# Patient Record
Sex: FEMALE | Race: WHITE | NOT HISPANIC OR LATINO | Employment: OTHER | URBAN - METROPOLITAN AREA
[De-identification: names, ages, dates, MRNs, and addresses within clinical notes are randomized per-mention and may not be internally consistent; named-entity substitution may affect disease eponyms.]

---

## 2018-01-01 ENCOUNTER — HOSPITAL ENCOUNTER (INPATIENT)
Facility: MEDICAL CENTER | Age: 83
LOS: 1 days | DRG: 871 | End: 2018-09-12
Attending: EMERGENCY MEDICINE | Admitting: HOSPITALIST

## 2018-01-01 ENCOUNTER — APPOINTMENT (OUTPATIENT)
Dept: RADIOLOGY | Facility: MEDICAL CENTER | Age: 83
DRG: 871 | End: 2018-01-01

## 2018-01-01 ENCOUNTER — HOSPICE ADMISSION (OUTPATIENT)
Dept: HOSPICE | Facility: HOSPICE | Age: 83
End: 2018-01-01

## 2018-01-01 ENCOUNTER — APPOINTMENT (OUTPATIENT)
Dept: RADIOLOGY | Facility: MEDICAL CENTER | Age: 83
DRG: 871 | End: 2018-01-01
Attending: HOSPITALIST

## 2018-01-01 VITALS
SYSTOLIC BLOOD PRESSURE: 84 MMHG | DIASTOLIC BLOOD PRESSURE: 40 MMHG | WEIGHT: 170.19 LBS | BODY MASS INDEX: 29.06 KG/M2 | TEMPERATURE: 98.2 F | HEART RATE: 97 BPM | OXYGEN SATURATION: 96 % | HEIGHT: 64 IN

## 2018-01-01 DIAGNOSIS — R65.21 SEPTIC SHOCK (HCC): ICD-10-CM

## 2018-01-01 DIAGNOSIS — A41.9 SEPTIC SHOCK (HCC): ICD-10-CM

## 2018-01-01 LAB
ALBUMIN SERPL BCP-MCNC: 3.1 G/DL (ref 3.2–4.9)
ALBUMIN/GLOB SERPL: 0.7 G/DL
ALP SERPL-CCNC: 89 U/L (ref 30–99)
ALT SERPL-CCNC: 12 U/L (ref 2–50)
AMMONIA PLAS-SCNC: 36 UMOL/L (ref 11–45)
AMORPH CRY #/AREA URNS HPF: PRESENT /HPF
AMPHET UR QL SCN: NEGATIVE
ANION GAP SERPL CALC-SCNC: 10 MMOL/L (ref 0–11.9)
ANION GAP SERPL CALC-SCNC: 15 MMOL/L (ref 0–11.9)
ANISOCYTOSIS BLD QL SMEAR: ABNORMAL
APPEARANCE UR: ABNORMAL
AST SERPL-CCNC: 36 U/L (ref 12–45)
BACTERIA #/AREA URNS HPF: ABNORMAL /HPF
BARBITURATES UR QL SCN: NEGATIVE
BASOPHILS # BLD AUTO: 0 % (ref 0–1.8)
BASOPHILS # BLD: 0 K/UL (ref 0–0.12)
BENZODIAZ UR QL SCN: NEGATIVE
BILIRUB SERPL-MCNC: 0.6 MG/DL (ref 0.1–1.5)
BILIRUB UR QL STRIP.AUTO: NEGATIVE
BUN SERPL-MCNC: 47 MG/DL (ref 8–22)
BUN SERPL-MCNC: 55 MG/DL (ref 8–22)
BZE UR QL SCN: NEGATIVE
CALCIUM SERPL-MCNC: 5.9 MG/DL (ref 8.5–10.5)
CALCIUM SERPL-MCNC: 9 MG/DL (ref 8.5–10.5)
CANNABINOIDS UR QL SCN: NEGATIVE
CHLORIDE SERPL-SCNC: 109 MMOL/L (ref 96–112)
CHLORIDE SERPL-SCNC: 119 MMOL/L (ref 96–112)
CO2 SERPL-SCNC: 12 MMOL/L (ref 20–33)
CO2 SERPL-SCNC: 16 MMOL/L (ref 20–33)
COLOR UR: YELLOW
CREAT SERPL-MCNC: 3.24 MG/DL (ref 0.5–1.4)
CREAT SERPL-MCNC: 4.28 MG/DL (ref 0.5–1.4)
EKG IMPRESSION: NORMAL
EKG IMPRESSION: NORMAL
EOSINOPHIL # BLD AUTO: 0 K/UL (ref 0–0.51)
EOSINOPHIL NFR BLD: 0 % (ref 0–6.9)
EPI CELLS #/AREA URNS HPF: ABNORMAL /HPF
ERYTHROCYTE [DISTWIDTH] IN BLOOD BY AUTOMATED COUNT: 55.3 FL (ref 35.9–50)
ERYTHROCYTE [DISTWIDTH] IN BLOOD BY AUTOMATED COUNT: 59.8 FL (ref 35.9–50)
ETHANOL BLD-MCNC: 0 G/DL
GLOBULIN SER CALC-MCNC: 4.3 G/DL (ref 1.9–3.5)
GLUCOSE BLD-MCNC: 116 MG/DL (ref 65–99)
GLUCOSE SERPL-MCNC: 123 MG/DL (ref 65–99)
GLUCOSE SERPL-MCNC: 132 MG/DL (ref 65–99)
GLUCOSE UR STRIP.AUTO-MCNC: NEGATIVE MG/DL
HCG SERPL QL: NEGATIVE
HCT VFR BLD AUTO: 35.2 % (ref 37–47)
HCT VFR BLD AUTO: 44 % (ref 37–47)
HGB BLD-MCNC: 11.3 G/DL (ref 12–16)
HGB BLD-MCNC: 14.9 G/DL (ref 12–16)
KETONES UR STRIP.AUTO-MCNC: ABNORMAL MG/DL
LACTATE BLD-SCNC: 2.2 MMOL/L (ref 0.5–2)
LACTATE BLD-SCNC: 2.8 MMOL/L (ref 0.5–2)
LACTATE BLD-SCNC: 3.7 MMOL/L (ref 0.5–2)
LACTATE BLD-SCNC: 4.2 MMOL/L (ref 0.5–2)
LEUKOCYTE ESTERASE UR QL STRIP.AUTO: ABNORMAL
LV EJECT FRACT  99904: 55
LV EJECT FRACT MOD 2C 99903: 52.84
LV EJECT FRACT MOD 4C 99902: 65.21
LV EJECT FRACT MOD BP 99901: 59.43
LYMPHOCYTES # BLD AUTO: 1.75 K/UL (ref 1–4.8)
LYMPHOCYTES NFR BLD: 3.5 % (ref 22–41)
MACROCYTES BLD QL SMEAR: ABNORMAL
MAGNESIUM SERPL-MCNC: 2.1 MG/DL (ref 1.5–2.5)
MANUAL DIFF BLD: ABNORMAL
MCH RBC QN AUTO: 32.6 PG (ref 27–33)
MCH RBC QN AUTO: 33.4 PG (ref 27–33)
MCHC RBC AUTO-ENTMCNC: 31.5 G/DL (ref 33.6–35)
MCHC RBC AUTO-ENTMCNC: 33.9 G/DL (ref 33.6–35)
MCV RBC AUTO: 103.2 FL (ref 81.4–97.8)
MCV RBC AUTO: 98.7 FL (ref 81.4–97.8)
METHADONE UR QL SCN: NEGATIVE
MICRO URNS: ABNORMAL
MONOCYTES # BLD AUTO: 1.75 K/UL (ref 0–0.85)
MONOCYTES NFR BLD AUTO: 3.5 % (ref 0–13.4)
MORPHOLOGY BLD-IMP: NORMAL
NEUTROPHILS # BLD AUTO: 46.5 K/UL (ref 2–7.15)
NEUTROPHILS NFR BLD: 80 % (ref 44–72)
NEUTS BAND NFR BLD MANUAL: 13 % (ref 0–10)
NITRITE UR QL STRIP.AUTO: POSITIVE
NRBC # BLD AUTO: 0 K/UL
NRBC BLD-RTO: 0 /100 WBC
OPIATES UR QL SCN: NEGATIVE
OXYCODONE UR QL SCN: NEGATIVE
PCP UR QL SCN: NEGATIVE
PH UR STRIP.AUTO: 5 [PH]
PLATELET # BLD AUTO: 111 K/UL (ref 164–446)
PLATELET # BLD AUTO: 65 K/UL (ref 164–446)
PLATELET BLD QL SMEAR: NORMAL
PMV BLD AUTO: 10.5 FL (ref 9–12.9)
PMV BLD AUTO: 10.5 FL (ref 9–12.9)
POTASSIUM SERPL-SCNC: 4.7 MMOL/L (ref 3.6–5.5)
POTASSIUM SERPL-SCNC: 5.6 MMOL/L (ref 3.6–5.5)
PROCALCITONIN SERPL-MCNC: >200 NG/ML
PROPOXYPH UR QL SCN: NEGATIVE
PROT SERPL-MCNC: 7.4 G/DL (ref 6–8.2)
PROT UR QL STRIP: 30 MG/DL
RBC # BLD AUTO: 3.41 M/UL (ref 4.2–5.4)
RBC # BLD AUTO: 4.46 M/UL (ref 4.2–5.4)
RBC # URNS HPF: ABNORMAL /HPF
RBC BLD AUTO: PRESENT
RBC UR QL AUTO: ABNORMAL
SODIUM SERPL-SCNC: 140 MMOL/L (ref 135–145)
SODIUM SERPL-SCNC: 141 MMOL/L (ref 135–145)
SP GR UR STRIP.AUTO: >=1.03
TRANS CELLS #/AREA URNS HPF: ABNORMAL /HPF
TROPONIN I SERPL-MCNC: 2.15 NG/ML (ref 0–0.04)
TROPONIN I SERPL-MCNC: 3.13 NG/ML (ref 0–0.04)
UROBILINOGEN UR STRIP.AUTO-MCNC: 0.2 MG/DL
WBC # BLD AUTO: 38.6 K/UL (ref 4.8–10.8)
WBC # BLD AUTO: 50 K/UL (ref 4.8–10.8)
WBC #/AREA URNS HPF: ABNORMAL /HPF

## 2018-01-01 PROCEDURE — 87077 CULTURE AEROBIC IDENTIFY: CPT

## 2018-01-01 PROCEDURE — 700111 HCHG RX REV CODE 636 W/ 250 OVERRIDE (IP): Performed by: HOSPITALIST

## 2018-01-01 PROCEDURE — 83605 ASSAY OF LACTIC ACID: CPT

## 2018-01-01 PROCEDURE — 94640 AIRWAY INHALATION TREATMENT: CPT

## 2018-01-01 PROCEDURE — 700105 HCHG RX REV CODE 258: Performed by: EMERGENCY MEDICINE

## 2018-01-01 PROCEDURE — 99291 CRITICAL CARE FIRST HOUR: CPT | Performed by: HOSPITALIST

## 2018-01-01 PROCEDURE — 36415 COLL VENOUS BLD VENIPUNCTURE: CPT

## 2018-01-01 PROCEDURE — 85007 BL SMEAR W/DIFF WBC COUNT: CPT

## 2018-01-01 PROCEDURE — 700101 HCHG RX REV CODE 250: Performed by: EMERGENCY MEDICINE

## 2018-01-01 PROCEDURE — 700105 HCHG RX REV CODE 258

## 2018-01-01 PROCEDURE — 700105 HCHG RX REV CODE 258: Performed by: HOSPITALIST

## 2018-01-01 PROCEDURE — 71045 X-RAY EXAM CHEST 1 VIEW: CPT

## 2018-01-01 PROCEDURE — 93306 TTE W/DOPPLER COMPLETE: CPT | Mod: 26 | Performed by: INTERNAL MEDICINE

## 2018-01-01 PROCEDURE — 87040 BLOOD CULTURE FOR BACTERIA: CPT

## 2018-01-01 PROCEDURE — 93005 ELECTROCARDIOGRAM TRACING: CPT

## 2018-01-01 PROCEDURE — 76775 US EXAM ABDO BACK WALL LIM: CPT

## 2018-01-01 PROCEDURE — 83735 ASSAY OF MAGNESIUM: CPT

## 2018-01-01 PROCEDURE — 85027 COMPLETE CBC AUTOMATED: CPT

## 2018-01-01 PROCEDURE — 87186 SC STD MICRODIL/AGAR DIL: CPT

## 2018-01-01 PROCEDURE — 84703 CHORIONIC GONADOTROPIN ASSAY: CPT

## 2018-01-01 PROCEDURE — 700101 HCHG RX REV CODE 250: Performed by: HOSPITALIST

## 2018-01-01 PROCEDURE — 700111 HCHG RX REV CODE 636 W/ 250 OVERRIDE (IP): Performed by: INTERNAL MEDICINE

## 2018-01-01 PROCEDURE — 99291 CRITICAL CARE FIRST HOUR: CPT | Performed by: INTERNAL MEDICINE

## 2018-01-01 PROCEDURE — 700105 HCHG RX REV CODE 258: Performed by: INTERNAL MEDICINE

## 2018-01-01 PROCEDURE — 80307 DRUG TEST PRSMV CHEM ANLYZR: CPT

## 2018-01-01 PROCEDURE — 700111 HCHG RX REV CODE 636 W/ 250 OVERRIDE (IP): Performed by: EMERGENCY MEDICINE

## 2018-01-01 PROCEDURE — 84484 ASSAY OF TROPONIN QUANT: CPT

## 2018-01-01 PROCEDURE — 84145 PROCALCITONIN (PCT): CPT

## 2018-01-01 PROCEDURE — 81001 URINALYSIS AUTO W/SCOPE: CPT

## 2018-01-01 PROCEDURE — 80048 BASIC METABOLIC PNL TOTAL CA: CPT

## 2018-01-01 PROCEDURE — 770001 HCHG ROOM/CARE - MED/SURG/GYN PRIV*

## 2018-01-01 PROCEDURE — 31720 CLEARANCE OF AIRWAYS: CPT

## 2018-01-01 PROCEDURE — 87086 URINE CULTURE/COLONY COUNT: CPT

## 2018-01-01 PROCEDURE — 304561 HCHG STAT O2

## 2018-01-01 PROCEDURE — 93005 ELECTROCARDIOGRAM TRACING: CPT | Performed by: HOSPITALIST

## 2018-01-01 PROCEDURE — 770022 HCHG ROOM/CARE - ICU (200)

## 2018-01-01 PROCEDURE — 96375 TX/PRO/DX INJ NEW DRUG ADDON: CPT

## 2018-01-01 PROCEDURE — 82140 ASSAY OF AMMONIA: CPT

## 2018-01-01 PROCEDURE — 80053 COMPREHEN METABOLIC PANEL: CPT

## 2018-01-01 PROCEDURE — 93010 ELECTROCARDIOGRAM REPORT: CPT | Performed by: INTERNAL MEDICINE

## 2018-01-01 PROCEDURE — 96365 THER/PROPH/DIAG IV INF INIT: CPT

## 2018-01-01 PROCEDURE — 99291 CRITICAL CARE FIRST HOUR: CPT

## 2018-01-01 PROCEDURE — 93306 TTE W/DOPPLER COMPLETE: CPT

## 2018-01-01 PROCEDURE — 99233 SBSQ HOSP IP/OBS HIGH 50: CPT | Performed by: HOSPITALIST

## 2018-01-01 PROCEDURE — 93005 ELECTROCARDIOGRAM TRACING: CPT | Performed by: EMERGENCY MEDICINE

## 2018-01-01 PROCEDURE — 82962 GLUCOSE BLOOD TEST: CPT

## 2018-01-01 RX ORDER — SODIUM CHLORIDE 9 MG/ML
500 INJECTION, SOLUTION INTRAVENOUS ONCE
Status: COMPLETED | OUTPATIENT
Start: 2018-01-01 | End: 2018-01-01

## 2018-01-01 RX ORDER — SODIUM CHLORIDE 9 MG/ML
INJECTION, SOLUTION INTRAVENOUS CONTINUOUS
Status: DISCONTINUED | OUTPATIENT
Start: 2018-01-01 | End: 2018-01-01

## 2018-01-01 RX ORDER — SODIUM CHLORIDE 9 MG/ML
30 INJECTION, SOLUTION INTRAVENOUS ONCE
Status: COMPLETED | OUTPATIENT
Start: 2018-01-01 | End: 2018-01-01

## 2018-01-01 RX ORDER — SODIUM CHLORIDE 9 MG/ML
INJECTION, SOLUTION INTRAVENOUS
Status: COMPLETED
Start: 2018-01-01 | End: 2018-01-01

## 2018-01-01 RX ORDER — IPRATROPIUM BROMIDE AND ALBUTEROL SULFATE 2.5; .5 MG/3ML; MG/3ML
6 SOLUTION RESPIRATORY (INHALATION) ONCE
Status: COMPLETED | OUTPATIENT
Start: 2018-01-01 | End: 2018-01-01

## 2018-01-01 RX ORDER — CALCIUM CHLORIDE 100 MG/ML
1 INJECTION INTRAVENOUS; INTRAVENTRICULAR ONCE
Status: DISCONTINUED | OUTPATIENT
Start: 2018-01-01 | End: 2018-01-01

## 2018-01-01 RX ORDER — HEPARIN SODIUM 5000 [USP'U]/ML
5000 INJECTION, SOLUTION INTRAVENOUS; SUBCUTANEOUS EVERY 8 HOURS
Status: DISCONTINUED | OUTPATIENT
Start: 2018-01-01 | End: 2018-01-01

## 2018-01-01 RX ORDER — SODIUM CHLORIDE 9 MG/ML
500 INJECTION, SOLUTION INTRAVENOUS
Status: COMPLETED | OUTPATIENT
Start: 2018-01-01 | End: 2018-01-01

## 2018-01-01 RX ORDER — LORAZEPAM 2 MG/ML
1-2 INJECTION INTRAMUSCULAR
Status: DISCONTINUED | OUTPATIENT
Start: 2018-01-01 | End: 2018-09-13 | Stop reason: HOSPADM

## 2018-01-01 RX ORDER — ATROPINE SULFATE 10 MG/ML
2 SOLUTION/ DROPS OPHTHALMIC EVERY 4 HOURS PRN
Status: DISCONTINUED | OUTPATIENT
Start: 2018-01-01 | End: 2018-01-01

## 2018-01-01 RX ORDER — SODIUM CHLORIDE 9 MG/ML
INJECTION, SOLUTION INTRAVENOUS
Status: DISCONTINUED
Start: 2018-01-01 | End: 2018-01-01

## 2018-01-01 RX ORDER — BISACODYL 10 MG
10 SUPPOSITORY, RECTAL RECTAL
Status: DISCONTINUED | OUTPATIENT
Start: 2018-01-01 | End: 2018-01-01

## 2018-01-01 RX ORDER — POLYETHYLENE GLYCOL 3350 17 G/17G
1 POWDER, FOR SOLUTION ORAL
Status: DISCONTINUED | OUTPATIENT
Start: 2018-01-01 | End: 2018-01-01

## 2018-01-01 RX ORDER — SODIUM CHLORIDE 9 MG/ML
30 INJECTION, SOLUTION INTRAVENOUS
Status: DISCONTINUED | OUTPATIENT
Start: 2018-01-01 | End: 2018-01-01

## 2018-01-01 RX ORDER — AZITHROMYCIN 500 MG/1
500 INJECTION, POWDER, LYOPHILIZED, FOR SOLUTION INTRAVENOUS ONCE
Status: COMPLETED | OUTPATIENT
Start: 2018-01-01 | End: 2018-01-01

## 2018-01-01 RX ORDER — AMOXICILLIN 250 MG
2 CAPSULE ORAL 2 TIMES DAILY
Status: DISCONTINUED | OUTPATIENT
Start: 2018-01-01 | End: 2018-01-01

## 2018-01-01 RX ADMIN — SODIUM CHLORIDE 500 ML: 9 INJECTION, SOLUTION INTRAVENOUS at 04:47

## 2018-01-01 RX ADMIN — IPRATROPIUM BROMIDE AND ALBUTEROL SULFATE 6 ML: .5; 3 SOLUTION RESPIRATORY (INHALATION) at 22:31

## 2018-01-01 RX ADMIN — AMPICILLIN AND SULBACTAM 3 G: 2; 1 INJECTION, POWDER, FOR SOLUTION INTRAVENOUS at 22:38

## 2018-01-01 RX ADMIN — METRONIDAZOLE 500 MG: 500 INJECTION, SOLUTION INTRAVENOUS at 00:53

## 2018-01-01 RX ADMIN — METRONIDAZOLE 500 MG: 500 INJECTION, SOLUTION INTRAVENOUS at 06:24

## 2018-01-01 RX ADMIN — CALCIUM CHLORIDE 1 G: 100 INJECTION, SOLUTION INTRAVENOUS at 07:23

## 2018-01-01 RX ADMIN — MORPHINE SULFATE 50 MG: 50 INJECTION, SOLUTION, CONCENTRATE INTRAVENOUS at 16:29

## 2018-01-01 RX ADMIN — SODIUM CHLORIDE 500 ML: 9 INJECTION, SOLUTION INTRAVENOUS at 00:39

## 2018-01-01 RX ADMIN — CEFTRIAXONE 2 G: 2 INJECTION, POWDER, FOR SOLUTION INTRAMUSCULAR; INTRAVENOUS at 00:53

## 2018-01-01 RX ADMIN — HEPARIN SODIUM 5000 UNITS: 5000 INJECTION, SOLUTION INTRAVENOUS; SUBCUTANEOUS at 06:26

## 2018-01-01 RX ADMIN — LORAZEPAM 2 MG: 2 INJECTION INTRAMUSCULAR; INTRAVENOUS at 18:44

## 2018-01-01 RX ADMIN — SODIUM BICARBONATE 150 MEQ: 84 INJECTION, SOLUTION INTRAVENOUS at 10:28

## 2018-01-01 RX ADMIN — HEPARIN SODIUM 5000 UNITS: 5000 INJECTION, SOLUTION INTRAVENOUS; SUBCUTANEOUS at 00:53

## 2018-01-01 RX ADMIN — AZITHROMYCIN MONOHYDRATE 500 MG: 500 INJECTION, POWDER, LYOPHILIZED, FOR SOLUTION INTRAVENOUS at 22:56

## 2018-01-01 RX ADMIN — SODIUM CHLORIDE 2448 ML: 9 INJECTION, SOLUTION INTRAVENOUS at 22:32

## 2018-01-01 RX ADMIN — SODIUM CHLORIDE: 9 INJECTION, SOLUTION INTRAVENOUS at 00:45

## 2018-01-01 RX ADMIN — SODIUM CHLORIDE: 9 INJECTION, SOLUTION INTRAVENOUS at 20:00

## 2018-01-01 RX ADMIN — METRONIDAZOLE 500 MG: 500 INJECTION, SOLUTION INTRAVENOUS at 14:39

## 2018-01-01 RX ADMIN — HEPARIN SODIUM 5000 UNITS: 5000 INJECTION, SOLUTION INTRAVENOUS; SUBCUTANEOUS at 14:40

## 2018-01-01 ASSESSMENT — LIFESTYLE VARIABLES: EVER_SMOKED: YES

## 2018-09-12 PROBLEM — N30.00 ACUTE CYSTITIS: Status: ACTIVE | Noted: 2018-01-01

## 2018-09-12 PROBLEM — A41.9 SEVERE SEPSIS (HCC): Status: RESOLVED | Noted: 2018-01-01 | Resolved: 2018-01-01

## 2018-09-12 PROBLEM — R65.20 SEVERE SEPSIS (HCC): Status: ACTIVE | Noted: 2018-01-01

## 2018-09-12 PROBLEM — A41.9 SEVERE SEPSIS (HCC): Status: ACTIVE | Noted: 2018-01-01

## 2018-09-12 PROBLEM — A41.9 SEPTIC SHOCK (HCC): Status: ACTIVE | Noted: 2018-01-01

## 2018-09-12 PROBLEM — I21.4 NSTEMI (NON-ST ELEVATED MYOCARDIAL INFARCTION) (HCC): Status: ACTIVE | Noted: 2018-01-01

## 2018-09-12 PROBLEM — R65.20 SEVERE SEPSIS (HCC): Status: RESOLVED | Noted: 2018-01-01 | Resolved: 2018-01-01

## 2018-09-12 PROBLEM — R65.21 SEPTIC SHOCK (HCC): Status: ACTIVE | Noted: 2018-01-01

## 2018-09-12 PROBLEM — Z66 DNR (DO NOT RESUSCITATE): Status: ACTIVE | Noted: 2018-01-01

## 2018-09-12 PROBLEM — N17.9 AKI (ACUTE KIDNEY INJURY) (HCC): Status: ACTIVE | Noted: 2018-01-01

## 2018-09-12 NOTE — DISCHARGE PLANNING
Medical Social Work    Referral: Family Support    Intervention: MSW received a call from unit clerk that pt's family is in the ER Lobby.  MSW verified with bedside RN that pt's family can be at bedside.  MSW escorted pt's ex-daughter-in-law, Nataly ([phone]) and family friend Natividad to bedside where they were updated by RN.  Nataly states that she's pt's POA and pt has lived with her for the last 45 years.  Nataly states that pt's Advance Directives are on file with [facility] in [place] as they just moved.  Fax number for [facility] is: [phone]).  MSW provided information to unit clerk to obtain Advance Directives/POA paperwork.      Plan: SW will follow as needed.

## 2018-09-12 NOTE — DISCHARGE PLANNING
Received Choice form at 3573  Agency/Facility Name: [facility]  Referral sent per Choice form @ 0150
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
## 2018-09-12 NOTE — PROGRESS NOTES
[facility] Hospitalist Progress Note    Date of Service: 2018    Chief Complaint  89 y.o. female admitted 2018 with altered level of consciousness.    Interval Problem Update  Ms. Alejandra has a history of nephrectomy that was brought to the ER due to lethargy and vomiting followed by shortness of breath that developed the day prior to admission. Her daughter in law confirmed DNR status. Ms. Alejandra was found to have septic shock from aspiration pneumonia, a NSTEMI, and acute renal failure. She has been admitted to the ICU.   She has had 5 liters of IV fluid in and 35 ml urine over night. I met with her daughter in law at bedside at length and we discussed Ms. Alejandra's very poor prognosis and that comfort care is appropriate. She notes that Ms. Alejandra has been stating that she has been ready to die.   Consultants/Specialty  Critical Care. I discussed with Dr. Scherer on ICU Hot Rounds.     Disposition  medical        Review of Systems   Unable to perform ROS: Mental acuity      Physical Exam  Laboratory/Imaging   Hemodynamics  Temp (24hrs), Av.4 °C (97.6 °F), Min:36.2 °C (97.1 °F), Max:36.5 °C (97.7 °F)   Temperature: 36.5 °C (97.7 °F)  Pulse  Av.9  Min: 81  Max: 95 Heart Rate (Monitored): 85  Blood Pressure : (!) 84/40, NIBP: 114/56      Respiratory      Respiration: (!) 28, Pulse Oximetry: 95 %, O2 Daily Delivery Respiratory : Venti Mask     Given By:: Mask, Work Of Breathing / Effort: Accessory Muscle Use;Moderate  RUL Breath Sounds: Coarse Crackles, RML Breath Sounds: Coarse Crackles, RLL Breath Sounds: Coarse Crackles, TERESA Breath Sounds: Coarse Crackles, LLL Breath Sounds: Coarse Crackles    Fluids    Intake/Output Summary (Last 24 hours) at 18 0706  Last data filed at 18 0600   Gross per 24 hour   Intake             4100 ml   Output               35 ml   Net             4065 ml       Nutrition  No orders of the defined types were placed in this encounter.    Physical Exam    Constitutional: She appears distressed.   Neck: Neck supple.   Cardiovascular:   Distant, regular heart sounds.    Pulmonary/Chest:   Tachypnea, increased work of breathing   Abdominal: Soft. She exhibits no distension. There is no tenderness.   Musculoskeletal: She exhibits edema. She exhibits no tenderness.   Neurological:   Encephalopathic and agitated. She does not follow   Skin: Skin is warm and dry.   Nursing note and vitals reviewed.      Recent Labs      09/11/18 2205 09/12/18   0542   WBC  50.0*  38.6*   RBC  4.46  3.41*   HEMOGLOBIN  14.9  11.3*   HEMATOCRIT  44.0  35.2*   MCV  98.7*  103.2*   MCH  33.4*  32.6   MCHC  33.9  31.5*   RDW  55.3*  59.8*   PLATELETCT  111*  65*   MPV  10.5  10.5     Recent Labs      09/11/18 2205 09/12/18   0542   SODIUM  140  141   POTASSIUM  4.7  5.6*   CHLORIDE  109  119*   CO2  16*  12*   GLUCOSE  123*  132*   BUN  55*  47*   CREATININE  4.28*  3.24*   CALCIUM  9.0  5.9*                      Assessment/Plan     * Septic shock ([facility])- (present on admission)   Assessment & Plan    Septic shock with lactic acid of 4.2  Secondary to aspiration pneumonia and UTI  The organ system failure associated with this disease process is the respiratory system as is evidenced by the increased requirements for supplemental oxygen.   WBC on admit 50  Procalcitonin is over 200  IV fluids and IV rocephin/flagyl        NSTEMI (non-ST elevated myocardial infarction) ([facility])- (present on admission)   Assessment & Plan    Troponin 3.13  She has not had chest pain.        Acute cystitis- (present on admission)   Assessment & Plan    Await culture  IV abx        DNR (do not resuscitate)- (present on admission)   Assessment & Plan    This is appropriate  Orders written to this effect  Comfort care orders placed after discussion with daughter in law        SHORTY (acute kidney injury) ([facility])- (present on admission)   Assessment & Plan    Per the daughter-in-law, the patient only has one kidney but  has had normal renal function in the past.   Likely due to septic shock  Renal ultrasound pending  Cr on admit 4.28  IV fluids ordered and follow Cr        IV morphine drip ordered.   Quality-Core Measures   Reviewed items::  Labs reviewed, Medications reviewed and Radiology images reviewed  DVT: comfort care.

## 2018-09-12 NOTE — CARE PLAN
Problem: Skin Integrity  Goal: Risk for impaired skin integrity will decrease    Intervention: Assess risk factors for impaired skin integrity and/or pressure ulcers  complete  Intervention: Implement precautions to protect skin integrity in collaboration with the interdisciplinary team  complete  Intervention: Assess and monitor skin integrity, appearance and/or temperature  complete
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
## 2018-09-12 NOTE — PROGRESS NOTES
Dr. Phyllis Phelps notified of decreased urine output, need for adams catheter during sepsis and a UA for cloudy foul smelling urine     Orders received. Orders read back to MD    Place adams catheter  Send UA  Increase continuous fluids to 125mL/hr

## 2018-09-12 NOTE — ED TRIAGE NOTES
"Pt was brought to the ED by EMS. Noted that she was having a \"stomach bug\" this morning with some N/V. This evening family noted AMS. UNK medical Hx  "

## 2018-09-12 NOTE — ED NOTES
from Lab called with critical result of Troponin at 3.13. Critical lab result read back to .   Dr. Phelps notified of critical lab result at 0002.  Critical lab result read back by Dr. Phelps.

## 2018-09-12 NOTE — ASSESSMENT & PLAN NOTE
Per the daughter-in-law, the patient only has one kidney but has had normal renal function in the past.   Likely due to septic shock  Renal ultrasound pending  Cr on admit 4.28  IV fluids ordered and follow Cr

## 2018-09-12 NOTE — PROGRESS NOTES
Dat from Lab called with critical result of WBC 38.6 HBG 11.3 at 0625. Critical lab result read back to Edmar.   Dr. Phelps notified of critical lab result at 0635.  Critical lab result read back by Dr. Phelps.

## 2018-09-12 NOTE — PALLIATIVE CARE
Palliative Care   PC RN faxed request for AD or POLST on file to [facility] in [place], [place] ([phone]).          Thank you for allowing Palliative Care to participate in this patient's care. Please feel free to call [phone] with any questions or concerns.

## 2018-09-12 NOTE — PROGRESS NOTES
Per Dr Phyllis Phelps give 1 gram calcium chloride IV x1 dose now for a calcium level 5.9    Orders read back. Orders placed

## 2018-09-12 NOTE — ASSESSMENT & PLAN NOTE
Septic shock with lactic acid of 4.2  Secondary to aspiration pneumonia and UTI  The organ system failure associated with this disease process is the respiratory system as is evidenced by the increased requirements for supplemental oxygen.   WBC on admit 50  Procalcitonin is over 200  IV fluids and IV rocephin/flagyl

## 2018-09-12 NOTE — PROGRESS NOTES
Conor from Lab called with critical result of Lactic 4.2 at 0258. Critical lab result read back to Conor.   Dr. Phelps notified of critical lab result at 0355.  Critical lab result read back by Dr. Phelps.

## 2018-09-12 NOTE — PALLIATIVE CARE
Palliative Care follow-up  Called [facility] Medical Records, they confirmed pt does have an AD on file. PC RN re-faxed request to [phone]. Once received they will fax pt's AD to PC RN.           Plan:  Obtain pt's AD, continue to support pt and family    Thank you for allowing Palliative Care to participate in this patient's care. Please feel free to call [phone] with any questions or concerns.

## 2018-09-12 NOTE — ASSESSMENT & PLAN NOTE
This is appropriate  Orders written to this effect  Comfort care orders placed after discussion with daughter in law
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
## 2018-09-12 NOTE — ED NOTES
from Lab called with critical result of WBC at 50. Critical lab result read back to .   Dr. Angelo notified of critical lab result at 4976.  Critical lab result read back by Dr. Angelo.

## 2018-09-12 NOTE — CONSULTS
Pulmonary & Critical Care Consult Note    DATE OF CONSULTATION:  9/12/2018     REFERRING PHYSICIAN:  Phyllis Phelps M.D.     CONSULTANT:  Matthew Scherer DO     REASON FOR CONSULTATION:  Severe sepsis      HISTORY OF PRESENT ILLNESS: Ms. Alejandra is a 89-year-old female with past medical history of nephrolithiasis and right nephrectomy who is admitted to the ICU for severe sepsis yesterday related to urinary tract infection and possible aspiration pneumonia.  She also had an NSTEMI as well as acute kidney injury with anuria.  She was started on Rocephin and Flagyl, her pro-calcitonin was greater than 200, UA showed packed WBCs with moderate bacteria, troponin was 3.12, lactic acid 3.7, creatinine 4.28, BUN 55, anion gap 15, CO2 16, WBC 50.  She has been given 5 L of crystalloid resuscitation and has only produced 35 mL's of urine since admission.  Her GCS has remained approximately 6 since admission despite improvement in her hemodynamics.  The patient's daughter-in-law who is reportedly her POA states the patient did not want to be brought to the hospital and would not want any aggressive treatment such as central line placement for pressors, CPR, intubation; however would be fine with medical management such as IV fluids and antibiotics.     PAST MEDICAL HISTORY:   No past medical history on file.     PAST SURGICAL HISTORY:    Past Surgical History:   Procedure Laterality Date   • KIDNEY HARVEST          ALLERGIES:   Patient has no known allergies.     MEDICATIONS PRIOR TO ADMISSION:   No current facility-administered medications on file prior to encounter.      No current outpatient prescriptions on file prior to encounter.       SOCIAL HISTORY:   Social History     Social History   • Marital status:      Spouse name: N/A   • Number of children: N/A   • Years of education: N/A     Occupational History   • Not on file.     Social History Main Topics   • Smoking status: Not on file   • Smokeless tobacco:  "Not on file   • Alcohol use Not on file   • Drug use: Unknown   • Sexual activity: Not on file     Other Topics Concern   • Not on file     Social History Narrative   • No narrative on file       FAMILY HISTORY:  No family history on file.     REVIEW OF SYSTEMS:  Unable to obtain secondary to patient's encephalopathic status     PHYSICAL EXAMINATION:  BP (!) 84/40   Pulse 88   Temp 36.5 °C (97.7 °F)   Resp (!) 28   Ht 1.626 m (5' 4\")   Wt 77.2 kg (170 lb 3.1 oz)   SpO2 95%   BMI 29.21 kg/m²   GENERAL: Well-nourished, well-developed, age-appropriate appearing female  HEENT: Normocephalic, atraumatic, dry mucous nares, external ears normal, external nose normal  NECK: Supple, trachea midline  PULM:   Coarse breath sounds bilaterally, equal expansion of chest  CVS: Regular rate and rhythm  ABDOMEN: Soft, nontender, nondistended, no rebound, guarding  EXTREMITIES: Trace pitting edema bilaterally  SKIN: Warm, pink, dry  NEURO: GCS 6 (E1, M4, V1), moves all 4 extremities    LABORATORY DATA:    Lab Results   Component Value Date/Time    WBC 38.6 (HH) 09/12/2018 05:42 AM    RBC 3.41 (L) 09/12/2018 05:42 AM    HEMOGLOBIN 11.3 (L) 09/12/2018 05:42 AM    HEMATOCRIT 35.2 (L) 09/12/2018 05:42 AM    .2 (H) 09/12/2018 05:42 AM    MCH 32.6 09/12/2018 05:42 AM    MCHC 31.5 (L) 09/12/2018 05:42 AM    MPV 10.5 09/12/2018 05:42 AM    NEUTSPOLYS 80.00 (H) 09/11/2018 10:05 PM    LYMPHOCYTES 3.50 (L) 09/11/2018 10:05 PM    MONOCYTES 3.50 09/11/2018 10:05 PM    EOSINOPHILS 0.00 09/11/2018 10:05 PM    BASOPHILS 0.00 09/11/2018 10:05 PM    ANISOCYTOSIS 1+ 09/11/2018 10:05 PM      Lab Results   Component Value Date/Time    SODIUM 141 09/12/2018 05:42 AM    POTASSIUM 5.6 (H) 09/12/2018 05:42 AM    CHLORIDE 119 (H) 09/12/2018 05:42 AM    CO2 12 (L) 09/12/2018 05:42 AM    GLUCOSE 132 (H) 09/12/2018 05:42 AM    BUN 47 (H) 09/12/2018 05:42 AM    CREATININE 3.24 (H) 09/12/2018 05:42 AM      No results found for: PROTHROMBTM, INR    "   IMAGING:   CXR (personally reviewed)  US-RENAL   Final Result      Mildly atrophic left kidney. Left renal cortical thinning.      Bilateral renal cysts.      Mild right pelviectasis.               DX-CHEST-PORTABLE (1 VIEW)   Final Result      1.  Hypoinflation with bibasilar infiltrate and/or atelectasis and possible small effusions.   2.  Mild cardiomegaly.      ECHOCARDIOGRAM COMP W/O CONT    (Results Pending)         ASSESSMENT/PLAN:  Severe sepsis   - Urinary and possible aspiration pneumonia sources   - Lactate >2, SHORTY, encephalopathy   - sepsis protocol   - source targeted antibiotics: Rocephin   - 30 mL/kg crystalloid bolus provided   - PRN IVF bolus to maintain MAP >65 mmHg   - blood, respiratory and urine cultures   - lactate every 4 hours until normalized or downtrending    Acute kidney injury   - IVF   - renal dose meds, avoid nephrotoxins   - Strict I/Os   - follow renal function   - urine studies    Acute undifferentiated encephalopathy   -Likely due to metabolic/infectious derangements   -Limit sedatives    Urinary tract infection   -Rocephin    NSTEMI   - type 2   - Trend troponin    Poor prognosis, will consult palliative care and consider hospice.  Attempting to obtain power of  paperwork from [facility] in [place]    Prophylaxis: SCDs    Patient is critically ill at this time.  I have spent 35 minutes examining this patient, all lab data, x-ray, and discussion with RN, RT, hospitalist, palliative care. Critical care time: 35 min. No time overlap. Procedures not included in time. Thank you for asking me to consult on the patient.  I appreciate the opportunity to assist in their care and will follow along closely with you.    Matthew Scherer, DO  Critical Care Medicine    Please note that this dictation was created using voice recognition software. The accuracy of the dictation is limited to the abilities of the software.I have made every reasonable attempt to correct  obvious errors, but I expect that there are errors of grammar and possibly content that I did not discover before finalizing the note.

## 2018-09-12 NOTE — PROGRESS NOTES
Terri from Lab called with critical result of Calcium 5.9 at 0628. Critical lab result read back to Terri.   Dr. Phelps notified of critical lab result at 0635.  Critical lab result read back by Dr. Phelps.

## 2018-09-12 NOTE — ED PROVIDER NOTES
ED Provider Note    ED Provider Note    Primary care provider: No primary care provider on file.  Means of arrival: EMS  History obtained from: Patient    CHIEF COMPLAINT  Chief Complaint   Patient presents with   • ALOC     N/V this morning. ALOC this evening. UNK Med Hx     Seen at 10:20 PM.   HPI  Malia Green is a 89 y.o. female who presents to the Emergency Department for altered mental status and shortness of breath.  The history is obtained by the patient's power of  who is also her former daughter-in-law.  The patient does not have any medical history, she was a long-time resident of [place] followed by [facility] and then moved to [place].  She has been living with the ex-daughter-in-law for the past 45 years.  They moved here from [place] 2 weeks ago.  The patient was in her usual state of health until yesterday when she had multiple episodes of vomiting.    EMS was contacted yesterday and they evaluated the patient approximately 26 hours ago.  Her vitals were allegedly stable at the time, they offered to transport her but the patient refused.  Shortly after they left her condition deteriorated.  She vomited several more times and then had a large loose bowel movement.  She then had decreased level of consciousness and has been not eating, drinking or talking for the past 20 hours.    She does not take any medications and does not have any known medical history.    The patient on a good day is ambulatory, she can feed herself, she does carry on conversations though she does have some intermittent short-term memory loss.  She speaks [place]i exclusively.  Per the power of , the patient is not a full code.  She does not wish to be placed on a ventilator.    REVIEW OF SYSTEMS  See HPI,   full review of systems extremely limited due to patient's clinical condition.    PAST MEDICAL HISTORY   Denies    SURGICAL HISTORY   has a past surgical history that includes kidney harvest.    SOCIAL  "HISTORY  Social History   Substance Use Topics   • Smoking status: Not on file   • Smokeless tobacco: Not on file   • Alcohol use Not on file      History   Drug use: Unknown       FAMILY HISTORY  No family history on file.    CURRENT MEDICATIONS  Reviewed.  See Encounter Summary.     ALLERGIES  No Known Allergies    PHYSICAL EXAM  VITAL SIGNS: BP (!) 84/40   Pulse 85   Temp 36.4 °C (97.6 °F)   Resp (!) 29   Ht 1.626 m (5' 4\")   Wt 81.6 kg (180 lb)   SpO2 96%   BMI 30.90 kg/m²   Constitutional: Obtunded, ill-appearing  HENT: Normocephalic, atraumatic, bilateral external ears normal. Nose normal.  Extremely dry mucosal membranes.  Eyes: Conjunctiva normal, non-icteric, EOMI.    Thorax & Lungs: Accessory muscle use noted, tachypnea, wet sounding respirations and diminished.  Cardiovascular: Regular rate, Regular rhythm, No murmurs, rubs or gallops.  Abdomen:  Soft, nontender, nondistended, normal active bowel sounds.   :    Skin: Visualized skin is  Dry, No erythema, No rash.   Musculoskeletal:   No cyanosis, clubbing or edema.  Neurologic: Pupils are equal and reactive.  GCS 3 at this time  Psychiatric: Unable to assess   lymphatic:  No cervical LAD    EKG   12 lead Interpreted by me  Rhythm:  Normal sinus rhythm   Rate: 93  Axis: normal  Ectopy: none  Conduction: normal  ST Segments: no acute change  T Waves: no acute change  Clinical Impression: Normal EKG without acute changes     RADIOLOGY  DX-CHEST-PORTABLE (1 VIEW)   Final Result      1.  Hypoinflation with bibasilar infiltrate and/or atelectasis and possible small effusions.   2.  Mild cardiomegaly.      ECHOCARDIOGRAM COMP W/O CONT    (Results Pending)   US-RENAL    (Results Pending)         COURSE & MEDICAL DECISION MAKING  Pertinent Labs & Imaging studies reviewed. (See chart for details)    Differential diagnoses include but are not limited to: Sepsis, aspiration pneumonia    10:20 PM - Medical record reviewed, patient seen and examined at " bedside.    IV fluids will be administered due to clinical signs of dehydration/hypotension/possible sepsis.      Following IV fluids the patient has significantly improved systolic blood pressure.      11:19 PM - Case discussed with Dr. Phelps.     11:37 PM -     Decision Making:  This is a 89 y.o. year old female who presents with decreasing level of consciousness, hypoxia, respiratory distress.  The history provided suggests a aspiration pneumonia as the patient had some nausea and vomiting that preceded her symptoms.  I strongly considered endotracheal intubation for airway protection, anticipated clinical course and hypoxia however the patient does have a DNR.  For this reason she was aggressively resuscitated using IV fluids, nebulizer treatments and supplemental oxygen.  Her oxygenation improved, she still does have increased work of breathing.  Her labs suggest sepsis as she has a impressive leukocytosis.  She also has acute kidney injury with a creatinine of 4.28.  Her baseline is unknown.  Troponin is also significantly elevated at 3.13, there are no signs of ischemic changes on EKG.  This may be demand versus a NSTEMI.    At this point the patient is critically ill.  She was given broad-spectrum antibiotics, fluid resuscitation, nebulized treatments with improvement though she is still altered, presumably due to sepsis.  I discussed the case with the power of /prior daughter-in-law.  She will be admitted in critical condition.    CRITICAL CARE  The very real possibilty of a deterioration of this patient's condition required the highest level of my preparedness for sudden, emergent intervention.  I provided critical care services, which included medication orders, frequent reevaluations of the patient's condition and response to treatment, ordering and reviewing test results, and discussing the case with various consultants.  The critical care time associated with the care of the patient was  30 minutes. Review chart for interventions. This time is exclusive of any other billable procedures.       FINAL IMPRESSION  NSTEMI, SHORTY, Sepsis, hypoxia

## 2018-09-13 RX ORDER — SODIUM CHLORIDE 9 MG/ML
1000 INJECTION, SOLUTION INTRAVENOUS ONCE
Status: CANCELLED | OUTPATIENT
Start: 2018-09-13 | End: 2018-09-13

## 2018-09-13 NOTE — DISCHARGE SUMMARY
Death Summary    Cause of Death  Septic shock due to urinary tract infection.    Comorbid Conditions at the Time of Death  Principal Problem:    Septic shock (HCC) POA: Yes  Active Problems:    NSTEMI (non-ST elevated myocardial infarction) ([facility]) POA: Yes    SHORTY (acute kidney injury) ([facility]) POA: Yes    DNR (do not resuscitate) POA: Yes    Acute cystitis POA: Yes  Resolved Problems:    * No resolved hospital problems. *      History of Presenting Illness and Hospital Course  This is a 89 y.o. female admitted 9/11/2018 with altered level of consciousness.  Ms. Alejandra has a history of nephrectomy that was brought to the ER due to lethargy and vomiting followed by shortness of breath that developed the day prior to admission. Her daughter in law confirmed DNR status. Ms. Alejandra was found to have septic shock from aspiration pneumonia, a NSTEMI, and acute renal failure. She has been admitted to the ICU.   She has had 5 liters of IV fluid in and 35 ml urine over night. I met with her daughter in law at bedside at length and we discussed Ms. Alejandra's very poor prognosis and that comfort care is appropriate. She notes that Ms. Alejandra has been stating that she has been ready to die. Palliative care and critical care were consulted.  Blood cultures were positive for gram-negative rods.   Time spent in death arrangements on date of death 45 minutes.  Death Date: 09/12/18   Death Time: [id number]         Pronounced By (RN1): Doretha Chand  Pronounced By (RN2): Fabiano Lance

## 2018-09-14 LAB
BACTERIA BLD CULT: ABNORMAL
BACTERIA BLD CULT: ABNORMAL
BACTERIA UR CULT: ABNORMAL
BACTERIA UR CULT: ABNORMAL
SIGNIFICANT IND 70042: ABNORMAL
SIGNIFICANT IND 70042: ABNORMAL
SITE SITE: ABNORMAL
SITE SITE: ABNORMAL
SOURCE SOURCE: ABNORMAL
SOURCE SOURCE: ABNORMAL

## 2018-09-17 LAB
BACTERIA BLD CULT: NORMAL
SIGNIFICANT IND 70042: NORMAL
SITE SITE: NORMAL
SOURCE SOURCE: NORMAL